# Patient Record
Sex: FEMALE | Race: OTHER
[De-identification: names, ages, dates, MRNs, and addresses within clinical notes are randomized per-mention and may not be internally consistent; named-entity substitution may affect disease eponyms.]

---

## 2018-05-04 ENCOUNTER — RECORD ABSTRACTING (OUTPATIENT)
Age: 83
End: 2018-05-04

## 2018-05-04 PROBLEM — Z00.00 ENCOUNTER FOR PREVENTIVE HEALTH EXAMINATION: Status: ACTIVE | Noted: 2018-05-04

## 2018-05-04 RX ORDER — FLUOXETINE HYDROCHLORIDE 40 MG/1
40 CAPSULE ORAL
Refills: 0 | Status: ACTIVE | COMMUNITY

## 2018-05-04 RX ORDER — FLUTICASONE PROPIONATE 44 UG/1
44 AEROSOL, METERED RESPIRATORY (INHALATION) TWICE DAILY
Refills: 0 | Status: ACTIVE | COMMUNITY

## 2018-05-04 RX ORDER — IBANDRONATE SODIUM 150 MG/1
150 TABLET, FILM COATED ORAL
Refills: 0 | Status: ACTIVE | COMMUNITY

## 2018-05-04 RX ORDER — EZETIMIBE 10 MG/1
10 TABLET ORAL DAILY
Refills: 0 | Status: ACTIVE | COMMUNITY

## 2018-05-16 ENCOUNTER — APPOINTMENT (OUTPATIENT)
Dept: PLASTIC SURGERY | Facility: CLINIC | Age: 83
End: 2018-05-16

## 2018-05-23 ENCOUNTER — APPOINTMENT (OUTPATIENT)
Dept: PLASTIC SURGERY | Facility: CLINIC | Age: 83
End: 2018-05-23

## 2019-05-17 ENCOUNTER — APPOINTMENT (OUTPATIENT)
Dept: PLASTIC SURGERY | Facility: CLINIC | Age: 84
End: 2019-05-17
Payer: MEDICARE

## 2019-05-17 VITALS
SYSTOLIC BLOOD PRESSURE: 142 MMHG | HEART RATE: 73 BPM | TEMPERATURE: 97.4 F | OXYGEN SATURATION: 97 % | DIASTOLIC BLOOD PRESSURE: 65 MMHG | RESPIRATION RATE: 20 BRPM

## 2019-05-17 PROCEDURE — 99213 OFFICE O/P EST LOW 20 MIN: CPT

## 2019-05-20 NOTE — ASSESSMENT
[FreeTextEntry1] : A:\par Patient referred by her dermatologist for treatment of two new areas of Basal cell carcinoma on the upper forehead and right thigh\par P:\par We discussed the treatment options at some length and favor excisional biopsies with flap closure.  \par Reviewed the material risks,  benefits,  and alternatives with Ms. KIYA GREENBERG  , including no surgery, and she  understands and wishes to proceed.\par

## 2019-05-20 NOTE — HISTORY OF PRESENT ILLNESS
[FreeTextEntry1] : Patient returns from wintering in [place] with two new areas of biopsy proven Basal Cell Carcinoma.  One is on the upper forehead and the other on the right thigh.  She denies any interval changes in her health

## 2019-05-20 NOTE — PHYSICAL EXAM
[NI] : Normal [de-identified] : 1.1 cm healing biopsy site on the upper forehead with no surrounding erythema or purulence\par No palpable adenopathy  [de-identified] : 1.4 cm healing biopsy site on the right anterior thigh with no surrounding erythema or purulence.  \par There is no palpable adenopathy

## 2019-05-20 NOTE — REASON FOR VISIT
[Consultation] : a consultation visit [FreeTextEntry1] : Basal cell carcinomas on the forehead and right thigh

## 2019-05-31 ENCOUNTER — APPOINTMENT (OUTPATIENT)
Dept: PLASTIC SURGERY | Facility: CLINIC | Age: 84
End: 2019-05-31
Payer: MEDICARE

## 2019-05-31 VITALS
RESPIRATION RATE: 20 BRPM | HEART RATE: 70 BPM | OXYGEN SATURATION: 97 % | DIASTOLIC BLOOD PRESSURE: 67 MMHG | TEMPERATURE: 98 F | SYSTOLIC BLOOD PRESSURE: 111 MMHG

## 2019-05-31 PROCEDURE — 12041 INTMD RPR N-HF/GENIT 2.5CM/<: CPT | Mod: 59

## 2019-05-31 PROCEDURE — 11422 EXC H-F-NK-SP B9+MARG 1.1-2: CPT | Mod: 59

## 2019-05-31 PROCEDURE — 14040 TIS TRNFR F/C/C/M/N/A/G/H/F: CPT

## 2019-05-31 NOTE — REASON FOR VISIT
[Procedure: _________] : a [unfilled] procedure visit [FreeTextEntry1] : Patient returns for wide excision and flap closure of a Basal Cell Carcinoma on the forehead, and excisiona nd intermediate closure of a Basal Cell Carcinoma on the right anterior thigh

## 2019-05-31 NOTE — ASSESSMENT
[FreeTextEntry1] : A:\par Basal cell carcinoma forehead\par Basal Cell Carcinoma right thigh\par P:\par Wide excision and advancement flap closure of forehead tumor\par Excision and intermediate closure of right thigh lesion\par Patient tolerated well\par Instructions reviewed

## 2019-05-31 NOTE — PROCEDURE
[FreeTextEntry1] : Basal cell carcinoma forehead and right thigh  [FreeTextEntry2] : wide excision and flap closure of a Basal Cell Carcinoma on the forehead, and excisiona nd intermediate closure of a Basal Cell Carcinoma on the right anterior thigh  [FreeTextEntry3] : Xylocaine 1% with epinephrine  [FreeTextEntry4] : 20 cc  [FreeTextEntry5] : none  [FreeTextEntry6] : Both lesions were marked for excision using pre operative photographs and with the patient in agreement.  Following sterile prep and drape, flap markings on the forehead were made and local anesthesia injected.  Incision was made as marked and the lesion excised with marking suture placed on the cephalic margin.  Hemostasis was assured and then flaps were undermined advanced and inset with Monocryl and nylon sutures and sterile dressing placed.   Attention was then turned to the right thigh and after sterile prep and drape Xylocaine with epinephrine injected for local anesthesia.  Incision was made as marked and the 1.6 cm lesion excised.  Hemostasis was assured and then the 2.4 cm defect closed by undermining the skin edges and advancing with Monocryl and nylon sutures.  Sterile dressings were placed.  patient tolerated well.   [FreeTextEntry7] : forehead excision with suture on cephalic margin, right thigh excision with suture on the cephalic margin

## 2019-06-06 ENCOUNTER — APPOINTMENT (OUTPATIENT)
Dept: PLASTIC SURGERY | Facility: CLINIC | Age: 84
End: 2019-06-06
Payer: MEDICARE

## 2019-06-06 DIAGNOSIS — C44.319 BASAL CELL CARCINOMA OF SKIN OF OTHER PARTS OF FACE: ICD-10-CM

## 2019-06-06 PROCEDURE — 99024 POSTOP FOLLOW-UP VISIT: CPT

## 2019-06-06 NOTE — PHYSICAL EXAM
[de-identified] : Shape and contour are good\par wound is clean and well approximated\par  No erythema or purulence

## 2019-06-06 NOTE — ASSESSMENT
[FreeTextEntry1] : A:\par Basal Cell Carcinoma forehead and right thigh\par P:\par sutures were removed and scar care reviewed\par pathology shows forehead BCCA with clear margins and thigh scar no BCCA and clear margins \par Patient to follow with her dermatologist.

## 2019-06-06 NOTE — REASON FOR VISIT
[Post Op: _________] : a [unfilled] post op visit [FreeTextEntry1] : Ms. KIYA GREENBERG  returns for suture removal, generally doing well with no complaints and no fevers or chills at home.

## 2019-07-24 ENCOUNTER — APPOINTMENT (OUTPATIENT)
Dept: PLASTIC SURGERY | Facility: CLINIC | Age: 84
End: 2019-07-24
Payer: MEDICARE

## 2019-07-24 VITALS
DIASTOLIC BLOOD PRESSURE: 74 MMHG | HEART RATE: 75 BPM | OXYGEN SATURATION: 97 % | RESPIRATION RATE: 20 BRPM | TEMPERATURE: 98.5 F | SYSTOLIC BLOOD PRESSURE: 121 MMHG

## 2019-07-24 PROCEDURE — 99213 OFFICE O/P EST LOW 20 MIN: CPT | Mod: 24

## 2019-07-24 NOTE — ASSESSMENT
[FreeTextEntry1] : A:\par Raised skin lesion right leg\par P:\par We discussed the treatment options at some length and favor excisional biopsy.  Reviewed the material risks,  benefits,  and alternatives with Ms. KIYA GREENBERG  , including no surgery, and she  understands and wishes to proceed.\par

## 2019-07-24 NOTE — REASON FOR VISIT
[Consultation] : a consultation visit [FreeTextEntry1] : Patient returns with a new raised skin lesion on the right leg

## 2019-07-24 NOTE — PHYSICAL EXAM
[NI] : Normal [de-identified] : 8 mm raised skin lesion right leg\par no surrounding erythema or purulence\par No palpable adenopathy

## 2019-08-14 ENCOUNTER — APPOINTMENT (OUTPATIENT)
Dept: PLASTIC SURGERY | Facility: CLINIC | Age: 84
End: 2019-08-14
Payer: MEDICARE

## 2019-08-14 DIAGNOSIS — D48.5 NEOPLASM OF UNCERTAIN BEHAVIOR OF SKIN: ICD-10-CM

## 2019-08-14 PROCEDURE — 14020 TIS TRNFR S/A/L 10 SQ CM/<: CPT | Mod: 78

## 2019-08-14 NOTE — PROCEDURE
[FreeTextEntry1] : Right lower extremity tumor  [FreeTextEntry2] : Wide excision right lower extremity tumor with flap closure  [FreeTextEntry3] : Xylocaine 1% with epinephrine  [FreeTextEntry4] : 25 cc  [FreeTextEntry5] : none  [FreeTextEntry6] : The right leg tumor was marked for excision, with the patient in agreement, and then excision and flap design drawn out.  Following a sterile prep and drape, Xylocaine with epinephrine was injected for local anesthesia.  Incision was made as marked and the lesion was excised. Hemostasis was assured, and then flaps undermined, advanced, and inset with Monocryl and nylon sutures.  Patient tolerated well.   [FreeTextEntry7] : Right lower extremity tumor with suture on proximal margin

## 2019-08-14 NOTE — REASON FOR VISIT
[Procedure: _________] : a [unfilled] procedure visit [FreeTextEntry1] : Patient returns for excision of a tumor of indeterminate origin on the right lower extremity

## 2019-08-14 NOTE — ASSESSMENT
[FreeTextEntry1] : A:\par Tumor right lower extremity \par P:\par Wide excision right lower extremity tumor with flap closure.  \par patient tolerated well\par Instructions reviewed

## 2019-08-30 ENCOUNTER — APPOINTMENT (OUTPATIENT)
Dept: PLASTIC SURGERY | Facility: CLINIC | Age: 84
End: 2019-08-30
Payer: MEDICARE

## 2019-08-30 DIAGNOSIS — C44.712 BASAL CELL CARCINOMA OF SKIN OF RIGHT LOWER LIMB, INCLUDING HIP: ICD-10-CM

## 2019-08-30 PROCEDURE — 99212 OFFICE O/P EST SF 10 MIN: CPT

## 2019-08-30 NOTE — PHYSICAL EXAM
[de-identified] : Wound is clean and well approximated with no erythema or purulence\par No palpable adenopathy

## 2019-08-30 NOTE — ASSESSMENT
[FreeTextEntry1] : A:\par Doing well post operative \par P:\par Sutures removed and scar care reviewed\par Pathology shows scar with no residual Basal Cell Carcinoma

## 2022-07-09 ENCOUNTER — TELEPHONE ENCOUNTER (OUTPATIENT)
Dept: URBAN - METROPOLITAN AREA CLINIC 121 | Facility: CLINIC | Age: 87
End: 2022-07-09

## 2022-07-10 ENCOUNTER — TELEPHONE ENCOUNTER (OUTPATIENT)
Dept: URBAN - METROPOLITAN AREA CLINIC 121 | Facility: CLINIC | Age: 87
End: 2022-07-10